# Patient Record
Sex: MALE | Race: OTHER | HISPANIC OR LATINO
[De-identification: names, ages, dates, MRNs, and addresses within clinical notes are randomized per-mention and may not be internally consistent; named-entity substitution may affect disease eponyms.]

---
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
## 2020-05-13 ENCOUNTER — APPOINTMENT (OUTPATIENT)
Dept: PEDIATRIC ENDOCRINOLOGY | Facility: CLINIC | Age: 20
End: 2020-05-13
Payer: MEDICAID

## 2020-05-13 PROCEDURE — 99213 OFFICE O/P EST LOW 20 MIN: CPT | Mod: 95

## 2020-05-13 NOTE — REVIEW OF SYSTEMS
[Change in Activity] : no change in activity [Nl] : Neurological [Fever] : no fever [Vomiting] : no vomiting [Diaphoresis] : not diaphoretic [Abdominal Pain] : no abdominal pain [Diarrhea] : no diarrhea [Constipation] : no constipation [Sleep Disturbances] : ~T no sleep disturbances [Heat Intolerance] : no intolerance to heat [Loss Of Hair] : no loss of hair [Cold Intolerance] : no intolerance to cold [Polyuria] : no polyuria [Smokers in Home] : no one in home smokes [Polydipsia] : no polydipsia [FreeTextEntry3] : facial, and back acne

## 2020-05-13 NOTE — DISCUSSION/SUMMARY
[FreeTextEntry1] : Monalisa is a 18yo male with congenital hypothyroidism, clinically euthyroid on levothyroxine 125mcg daily.\par Will obtain thyroid function and adjust dose as needed.\par Return to clinic in 4 months.

## 2020-05-13 NOTE — REASON FOR VISIT
[Follow-Up: _____] : a [unfilled] follow-up visit  [Patient] : patient [FreeTextEntry1] : congenital hypothyroidism

## 2020-05-13 NOTE — HISTORY OF PRESENT ILLNESS
[Home] : at home, [unfilled] , at the time of the visit. [Other Location: e.g. Home (Enter Location, City,State)___] : at [unfilled] [Self] : self [Patient] : the patient [Visual Symptoms] : no ~T visual symptoms [Headaches] : no headaches [Constipation] : no constipation [Cold Intolerance] : no cold intolerance [Sweating] : no sweating [Palpitations] : no palpitations [Fatigue] : no fatigue [Heat Intolerance] : no heat intolerance [Abdominal Pain] : no abdominal pain [Weakness] : no weakness [FreeTextEntry2] : Monalisa is a 18yo male with congenital hypothyroidism, history of non compliance.\par He is currently on levothyroxine 125mcg daily.  No missed doses as per patient. \par Otherwise doing well, no complaints.   [TWNoteComboBox1] : congenital hypothyroidism

## 2020-05-13 NOTE — CONSULT LETTER
[Dear  ___] : Dear  [unfilled], [Please see my note below.] : Please see my note below. [Courtesy Letter:] : I had the pleasure of seeing your patient, [unfilled], in my office today. [Sincerely,] : Sincerely, [Consult Closing:] : Thank you very much for allowing me to participate in the care of this patient.  If you have any questions, please do not hesitate to contact me. [FreeTextEntry3] : Ari Kahn MD\par Division of Pediatric Endocrinology \par [facility] \par  of Pediatrics \par [facility]

## 2020-05-13 NOTE — PHYSICAL EXAM
[Healthy Appearing] : healthy appearing [Well Nourished] : well nourished [Interactive] : interactive [Acanthosis Nigricans___] : no acanthosis nigricans [Well formed] : well formed [Normal Appearance] : normal appearance [Normally Set] : normally set [Normal] : normal  [de-identified] : acne facial scars, and moderate acne on back

## 2020-09-02 ENCOUNTER — APPOINTMENT (OUTPATIENT)
Dept: PEDIATRIC ENDOCRINOLOGY | Facility: CLINIC | Age: 20
End: 2020-09-02

## 2020-09-09 ENCOUNTER — APPOINTMENT (OUTPATIENT)
Dept: PEDIATRIC ENDOCRINOLOGY | Facility: CLINIC | Age: 20
End: 2020-09-09
Payer: MEDICAID

## 2020-09-09 VITALS
WEIGHT: 120 LBS | TEMPERATURE: 97.7 F | HEIGHT: 65.04 IN | SYSTOLIC BLOOD PRESSURE: 107 MMHG | BODY MASS INDEX: 19.99 KG/M2 | DIASTOLIC BLOOD PRESSURE: 66 MMHG | HEART RATE: 66 BPM

## 2020-09-09 DIAGNOSIS — E03.1 CONGENITAL HYPOTHYROIDISM W/OUT GOITER: ICD-10-CM

## 2020-09-09 PROCEDURE — 99213 OFFICE O/P EST LOW 20 MIN: CPT

## 2020-09-09 NOTE — CONSULT LETTER
[Dear  ___] : Dear  [unfilled], [Courtesy Letter:] : I had the pleasure of seeing your patient, [unfilled], in my office today. [Please see my note below.] : Please see my note below. [Sincerely,] : Sincerely, [Consult Closing:] : Thank you very much for allowing me to participate in the care of this patient.  If you have any questions, please do not hesitate to contact me. [FreeTextEntry3] : Ari Kahn MD\par Division of Pediatric Endocrinology \par [facility] \par  of Pediatrics \par [facility]

## 2020-09-09 NOTE — PHYSICAL EXAM
[Healthy Appearing] : healthy appearing [Well Nourished] : well nourished [Interactive] : interactive [Well formed] : well formed [Normal Appearance] : normal appearance [Normally Set] : normally set [Acanthosis Nigricans___] : no acanthosis nigricans [Normal] : the thyroid was normal [Goiter] : no goiter [Clear to Ausculation Bilaterally] : clear to auscultation bilaterally [Normal S1 and S2] : normal S1 and S2 [de-identified] : acne facial scars, and moderate acne on back [Abdomen Soft] : soft

## 2020-09-09 NOTE — DISCUSSION/SUMMARY
[FreeTextEntry1] : Monalisa is a 21yo male with congenital hypothyroidism, clinically euthyroid on levothyroxine 125mcg daily.\par Will obtain thyroid function and adjust dose as needed.\par As Monalisa is 21yo advised to locate adult endocrinologist for f/u.  We will continue to support his care until transition.

## 2020-09-09 NOTE — HISTORY OF PRESENT ILLNESS
[Home] : at home, [unfilled] , at the time of the visit. [Other Location: e.g. Home (Enter Location, City,State)___] : at [unfilled] [Self] : self [Patient] : the patient [Headaches] : no headaches [Visual Symptoms] : no ~T visual symptoms [Constipation] : no constipation [Cold Intolerance] : no cold intolerance [Sweating] : no sweating [Palpitations] : no palpitations [Heat Intolerance] : no heat intolerance [Fatigue] : no fatigue [Weakness] : no weakness [Abdominal Pain] : no abdominal pain [FreeTextEntry2] : Monalisa is a 21yo male with congenital hypothyroidism who comes for follow up.  \par He is currently on levothyroxine 125mcg daily.  No missed doses as per patient. \par Otherwise doing well, no complaints.   [TWNoteComboBox1] : congenital hypothyroidism

## 2020-09-09 NOTE — REVIEW OF SYSTEMS
[Nl] : Genitourinary [Change in Activity] : no change in activity [Fever] : no fever [Diaphoresis] : not diaphoretic [Vomiting] : no vomiting [Diarrhea] : no diarrhea [Abdominal Pain] : no abdominal pain [Constipation] : no constipation [Sleep Disturbances] : ~T no sleep disturbances [Cold Intolerance] : no intolerance to cold [Heat Intolerance] : no intolerance to heat [Loss Of Hair] : no loss of hair [Polydipsia] : no polydipsia [Polyuria] : no polyuria [Smokers in Home] : no one in home smokes [FreeTextEntry3] : facial, and back acne

## 2020-09-10 LAB
T4 FREE SERPL-MCNC: 1.2 NG/DL
TSH SERPL-ACNC: 13 UIU/ML

## 2020-09-10 RX ORDER — LEVOTHYROXINE SODIUM 0.14 MG/1
137 TABLET ORAL DAILY
Qty: 30 | Refills: 3 | Status: ACTIVE | COMMUNITY
Start: 2020-09-10 | End: 1900-01-01

## 2020-09-10 RX ORDER — LEVOTHYROXINE SODIUM 0.12 MG/1
125 TABLET ORAL
Qty: 30 | Refills: 6 | Status: COMPLETED | COMMUNITY
Start: 2019-05-15 | End: 2020-09-10

## 2020-09-17 ENCOUNTER — RX RENEWAL (OUTPATIENT)
Age: 20
End: 2020-09-17

## 2022-12-27 ENCOUNTER — EMERGENCY (EMERGENCY)
Facility: HOSPITAL | Age: 22
LOS: 1 days | Discharge: HOME | End: 2022-12-27
Admitting: EMERGENCY MEDICINE
Payer: MEDICAID

## 2022-12-27 VITALS
HEART RATE: 80 BPM | RESPIRATION RATE: 18 BRPM | SYSTOLIC BLOOD PRESSURE: 127 MMHG | OXYGEN SATURATION: 100 % | DIASTOLIC BLOOD PRESSURE: 75 MMHG | TEMPERATURE: 98 F

## 2022-12-27 PROCEDURE — 99284 EMERGENCY DEPT VISIT MOD MDM: CPT

## 2022-12-27 NOTE — ED ADULT TRIAGE NOTE - CHIEF COMPLAINT QUOTE
Pt reporting to the Ed for dog bite. Reports his dog bit him tonight on right hand. Pt has ace wrap to hand, bleeding controlled. Reports dog is up to date on vaccinations.

## 2022-12-28 VITALS
TEMPERATURE: 98 F | HEART RATE: 85 BPM | RESPIRATION RATE: 17 BRPM | SYSTOLIC BLOOD PRESSURE: 123 MMHG | OXYGEN SATURATION: 99 % | DIASTOLIC BLOOD PRESSURE: 73 MMHG

## 2022-12-28 PROCEDURE — 73130 X-RAY EXAM OF HAND: CPT | Mod: 26,RT

## 2022-12-28 RX ORDER — IBUPROFEN 200 MG
600 TABLET ORAL ONCE
Refills: 0 | Status: COMPLETED | OUTPATIENT
Start: 2022-12-28 | End: 2022-12-28

## 2022-12-28 RX ADMIN — Medication 1 TABLET(S): at 01:06

## 2022-12-28 RX ADMIN — Medication 600 MILLIGRAM(S): at 01:06

## 2022-12-28 NOTE — ED PROVIDER NOTE - OBJECTIVE STATEMENT
22-year-old male no reported past medical history presents with dog bite to right hand.  Patient states he was bit by his own dog when trying to remove food from his mouth.  Patient reports dog bit down on the right hand.  Patient reports pain and swelling to the hand.  Patient states dog is up-to-date on all vaccines.  Patient reports he received tetanus shot last year.  Patient denies any other injury or complaint at this time.

## 2022-12-28 NOTE — ED PROVIDER NOTE - PATIENT PORTAL LINK FT
You can access the FollowMyHealth Patient Portal offered by [facility] by registering at the following website: [email or web address]. By joining [facility]’s FollowMyHealth portal, you will also be able to view your health information using other applications (apps) compatible with our system.

## 2022-12-28 NOTE — ED ADULT NURSE NOTE - OBJECTIVE STATEMENT
Pt received to intake hallway. Pt A&Ox4, ambulatory at baseline. Pt c/o dog bite to R hand. States he tried to take an apple from his dog which resulted in bite. R hand wrapped, bleeding controlled. Pt medicated as per MAR. Denies any medical hx. RR even and unlabored, pt in NAD. Safety in place, family at bedside

## 2022-12-28 NOTE — ED PROVIDER NOTE - CLINICAL SUMMARY MEDICAL DECISION MAKING FREE TEXT BOX
22-year-old male no reported past medical history presents with dog bite to right hand.   plan  - augmentin  - xr hand  - UTD on tetanus

## 2022-12-28 NOTE — ED PROVIDER NOTE - NSFOLLOWUPINSTRUCTIONS_ED_ALL_ED_FT
Take antibiotics as prescribed    Follow up with your primary care physician bring copies of your results.  Return to the ER for worsening or persistent symptoms, including but not limited to worsening/persistent pain, shortness of breath, fevers, vomiting, lightheadedness, passing out and/or ANY NEW OR CONCERNING SYMPTOMS. If you have issues obtaining follow up, please call: [phone] to obtain a doctor or specialist who takes your insurance in your area.  You may call [phone] to make an appointment with the internal medicine clinic.
